# Patient Record
Sex: MALE | Race: WHITE | Employment: FULL TIME | URBAN - METROPOLITAN AREA
[De-identification: names, ages, dates, MRNs, and addresses within clinical notes are randomized per-mention and may not be internally consistent; named-entity substitution may affect disease eponyms.]

---

## 2022-08-08 ENCOUNTER — OFFICE VISIT (OUTPATIENT)
Dept: ENT CLINIC | Age: 37
End: 2022-08-08
Payer: COMMERCIAL

## 2022-08-08 VITALS
WEIGHT: 169 LBS | SYSTOLIC BLOOD PRESSURE: 113 MMHG | HEIGHT: 68 IN | DIASTOLIC BLOOD PRESSURE: 69 MMHG | BODY MASS INDEX: 25.61 KG/M2 | HEART RATE: 63 BPM

## 2022-08-08 DIAGNOSIS — J34.89 NASAL OBSTRUCTION: ICD-10-CM

## 2022-08-08 DIAGNOSIS — J32.4 CHRONIC PANSINUSITIS: ICD-10-CM

## 2022-08-08 DIAGNOSIS — J33.8 NASAL SINUS POLYP: Primary | ICD-10-CM

## 2022-08-08 DIAGNOSIS — J34.3 NASAL TURBINATE HYPERTROPHY: ICD-10-CM

## 2022-08-08 PROCEDURE — 99204 OFFICE O/P NEW MOD 45 MIN: CPT | Performed by: OTOLARYNGOLOGY

## 2022-08-08 PROCEDURE — 31231 NASAL ENDOSCOPY DX: CPT | Performed by: OTOLARYNGOLOGY

## 2022-08-08 ASSESSMENT — ENCOUNTER SYMPTOMS
STRIDOR: 0
BLOOD IN STOOL: 0
BACK PAIN: 0
CONSTIPATION: 0
APNEA: 0
SORE THROAT: 0
SINUS PAIN: 0
COUGH: 0
TROUBLE SWALLOWING: 0
SHORTNESS OF BREATH: 0
CHOKING: 0
NAUSEA: 0
VOICE CHANGE: 0
RHINORRHEA: 0
COLOR CHANGE: 0
VOMITING: 0
EYE DISCHARGE: 0
FACIAL SWELLING: 0
WHEEZING: 0
DIARRHEA: 0
SINUS PRESSURE: 0
EYE PAIN: 0
CHEST TIGHTNESS: 0

## 2022-08-08 NOTE — LETTER
[facility]  [address]  Phone: [phone]  Fax: [phone]    Ofelia John MD    August 8, 2022     [place] [address]    Patient: John Paul Thoams   MR Number: [id number]   YOB: 1985   Date of Visit: 8/8/2022       Dear Alannah Rich: Thank you for referring Becka Hernandez to me for evaluation/treatment. Below are the relevant portions of my assessment and plan of care. Diagnosis Orders   1. Nasal sinus polyp  CT SINUS WO CONTRAST      2. Nasal obstruction  CT SINUS WO CONTRAST      3. Chronic pansinusitis  CT SINUS WO CONTRAST              OR for revision surgery. [place] image guidance CT scan sinuses. The patient has a diagnosis of CRS with polyps and nasal obstruction, turbinate hypertrophy which has not been responsive or cannot be treated with maximal medical therapy. The patient has been advised of options including further medical therapy, surgery, or nothing. The risks and benefits of surgery were described not limited to injury to the skull base, brain, stroke, hemorrhage, brain fluid leak, double vision, visual loss, epistaxis, and need for further surgical management of disease. Patient expectations during and after surgery including post-operative sinonasal care and pain control were described. Other health co-morbidities that would increase the risks of bodily harm, complications and risk of death including none were not discussed . Questions were answered and the patient agreed to proceed with surgery which will be scheduled in an appropriate time frame in line with the severity of disease. If you have questions, please do not hesitate to call me. I look forward to following Almas Gillespie along with you.     Sincerely,      Ofelia John MD

## 2022-08-08 NOTE — LETTER
[facility]    Surgery Schedule Request Form: 08/08/22  [address] Nany Guo      DATE OF SURGERY: 8-    TIME OF SURGERY:  7:30AM            CONF #: ____________________       Patient Information:    Patient name: Des Ca    YOB: 1985 Age/Sex:36 y.o./male    SS #:[id number]    Wt Readings from Last 1 Encounters:   08/08/22 169 lb (76.7 kg)       Telephone Information:   Mobile [phone]     Home [phone]     Surgeon & Procedure Information:     Lead surgeon: Jovon Garcia Co-Surgeon: ga  Phone: [phone] Fax: [phone]  PCP: Annie Rojas DO    Diagnosis: Chronic pansinusitis-J32.4, turbinate hypertrophy-J34.3, nasal sinus polyps-J33.8, nasal obstruction-J34.89    Procedure name/CPT: Bilateral Inferior Turbinate Reduction (01107-30), Bilateral Maxillary Antrostomy with Tissue Removal (50193-17), Bilateral Sphenoidostomy (60868-12), Frontal Sinus Exploration (79953) and Image Navigation (52030) Please not frontal sinus should be 50 modified. Post-operative debridement (55181-40, 2 units    Procedure length: 2 hours Anesthesia: General    Special Equipment: yes - [place] Image GUidance    Patient Status: SDS (OP)    Primary Payor Plan: [employer]  Member ID: [id number]   [address] [place] [address] Drive name: Dixon Harsh     [] Implement attached clinical orders for patient.       Electronically signed by Bassam Wade MD on 8/8/2022 at 11:48 AM

## 2022-08-08 NOTE — PROGRESS NOTES
Subjective:      Patient ID: Robbie Lyons is a 43 y.o. male. HPI  Chief Complaint   Patient presents in consultation from Dr. Hien Varela problem  History of Present Illness:Jerod is a(n) 43 y.o. male who presents with a 10+ year history of CRS with polyps. Last surgery 2011. UC. Now with 6+ month history of worsening congestion and thick mucoid debris. Using flonase. Nasal Discharge: thick mucoid  Nasal Obstruction: Yes bilateral; constant  Post Nasal Drainage: No  Nasal Bleeding: No  Sense of Smell: decreased   time. Current allergist:  No.  History of Facial/Head Trauma: No  Pain/Discomfort:No  Headaches: none  Aspirin Sensitivity: No  Eye Symptoms: none  Previous Treatments: As above   Also Singulair  Afrin helps congestion.  Uses 2-3 times a month        Patient Active Problem List   Diagnosis    Asthma    Intractable chronic migraine without aura    Medication overuse headache    H/O viral meningitis    Cervicalgia    Bilateral carpal tunnel syndrome    Peroneal neuropathy    Depression    ADHD (attention deficit hyperactivity disorder)     Past Surgical History:   Procedure Laterality Date    SINUS SURGERY  2008    Polyps, Sinus Re-construction, Septum     Family History   Problem Relation Age of Onset    Other Mother         Headache    Other Father         Headache    Hypertension Father     Depression Father        Social History     Socioeconomic History    Marital status:      Spouse name: Not on file    Number of children: Not on file    Years of education: Not on file    Highest education level: Not on file   Occupational History    Not on file   Tobacco Use    Smoking status: Never    Smokeless tobacco: Never   Substance and Sexual Activity    Alcohol use: Yes     Comment: 2-3 times per week     Drug use: No    Sexual activity: Not on file   Other Topics Concern    Not on file   Social History Narrative    Not on file     Social Determinants of Health     Financial Resource Strain: Not on file   Food Insecurity: Not on file   Transportation Needs: Not on file   Physical Activity: Not on file   Stress: Not on file   Social Connections: Not on file   Intimate Partner Violence: Not on file   Housing Stability: Not on file       DRUG/FOOD ALLERGIES: Patient has no known allergies. CURRENT MEDICATIONS  Prior to Admission medications    Medication Sig Start Date End Date Taking? Authorizing Provider   VENTOLIN  (90 BASE) MCG/ACT inhaler INHALE 2 PUFFS BY MOUTH FOUR TIMES DAILY AS NEEDED 7/22/16  Yes Hubert Hicks DO   montelukast (SINGULAIR) 10 MG tablet Take 10 mg by mouth nightly. Yes Historical Provider, MD   Multiple Vitamins-Minerals (MULTIVITAMIN PO) Take  by mouth. Yes Historical Provider, MD   ibuprofen (ADVIL;MOTRIN) 200 MG tablet Take 200 mg by mouth every 6 hours as needed. Yes Historical Provider, MD   Pseudoephedrine-Naproxen Na -220 MG TB12 Take by mouth    Historical Provider, MD   amphetamine-dextroamphetamine (ADDERALL XR) 20 MG XR capsule Take 20 mg by mouth 2 times daily    Historical Provider, MD   budesonide-formoterol (SYMBICORT) 80-4.5 MCG/ACT AERO Inhale 2 puffs into the lungs 2 times daily    Historical Provider, MD   venlafaxine (EFFEXOR) 37.5 MG tablet Take 37.5 mg by mouth daily    Historical Provider, MD   SUMAtriptan Succinate 6 MG/0.5ML RAY Inject 1 applicator into the skin as needed for Other (Migraine). 12/9/13   Alison Ulrich MD   topiramate (TOPAMAX) 50 MG tablet Take 1 tablet by mouth 2 times daily. 12/9/13   Alison Ulrich MD   SUMAtriptan (IMITREX) 100 MG tablet Take 1 tablet by mouth once as needed for Migraine for 1 dose.  12/9/13   Padmaja Wu Contractor, MD       Lab Studies:No results found for: WBC, HGB, HCT, MCV, PLT  No results found for: GLUCOSE, BUN, CREATININE, K, NA, CL, CALCIUM  No results found for: MG  No results found for: PHOS  No results found for: ALKPHOS, ALT, AST, BILITOT, ALBUMIN, PROT, LABGLOB Review of Systems   Constitutional:  Negative for activity change, appetite change, chills, fatigue and fever. HENT:  Positive for congestion. Negative for dental problem, drooling, ear discharge, ear pain, facial swelling, hearing loss, mouth sores, nosebleeds, postnasal drip, rhinorrhea, sinus pressure, sinus pain, sneezing, sore throat, tinnitus, trouble swallowing and voice change. Eyes:  Negative for pain, discharge and visual disturbance. Respiratory:  Negative for apnea, cough, choking, chest tightness, shortness of breath, wheezing and stridor. Cardiovascular:  Negative for palpitations. Gastrointestinal:  Negative for blood in stool, constipation, diarrhea, nausea and vomiting. Endocrine: Negative for cold intolerance, heat intolerance, polydipsia, polyphagia and polyuria. Musculoskeletal:  Negative for back pain, gait problem, neck pain and neck stiffness. Skin:  Negative for color change, pallor, rash and wound. Allergic/Immunologic: Negative for environmental allergies, food allergies and immunocompromised state. Neurological:  Negative for dizziness, facial asymmetry, speech difficulty, light-headedness, numbness and headaches. Hematological:  Negative for adenopathy. Does not bruise/bleed easily. Psychiatric/Behavioral:  Negative for agitation, confusion, self-injury and sleep disturbance. The patient is not nervous/anxious. Objective:   Physical Exam  Constitutional:       Appearance: He is well-developed. He is not ill-appearing. HENT:      Head: Normocephalic and atraumatic. Not macrocephalic and not microcephalic. No raccoon eyes, Munoz's sign, abrasion, contusion, right periorbital erythema, left periorbital erythema or laceration. Hair is normal.      Jaw: No trismus. Salivary Glands: Right salivary gland is not diffusely enlarged. Left salivary gland is not diffusely enlarged.       Right Ear: Hearing, tympanic membrane and external ear normal. No decreased hearing noted. No drainage, swelling or tenderness. No middle ear effusion. No mastoid tenderness. Tympanic membrane is not perforated, retracted or bulging. Tympanic membrane has normal mobility. Left Ear: Hearing, tympanic membrane and external ear normal. No decreased hearing noted. No drainage, swelling or tenderness. No middle ear effusion. No mastoid tenderness. Tympanic membrane is not perforated, retracted or bulging. Tympanic membrane has normal mobility. Ears:      [place] exam findings: Does not lateralize. Right Rinne: AC > BC. Left Rinne: AC > BC. Nose: No nasal deformity, septal deviation, laceration, mucosal edema or rhinorrhea. Right Nostril: No epistaxis. Left Nostril: No epistaxis. Right Turbinates: Enlarged and swollen. Left Turbinates: Enlarged and swollen. Right Sinus: No maxillary sinus tenderness or frontal sinus tenderness. Left Sinus: No maxillary sinus tenderness or frontal sinus tenderness. Mouth/Throat:      Lips: No lesions. Mouth: Mucous membranes are not pale, not dry and not cyanotic. No lacerations or oral lesions. Dentition: Normal dentition. No dental caries or dental abscesses. Tongue: No lesions. Palate: No mass. Pharynx: Uvula midline. No oropharyngeal exudate, posterior oropharyngeal erythema or uvula swelling. Tonsils: No tonsillar abscesses. Eyes:      General: Lids are normal. Lids are everted, no foreign bodies appreciated. Right eye: No discharge. Left eye: No discharge. Extraocular Movements:      Right eye: Normal extraocular motion and no nystagmus. Left eye: Normal extraocular motion and no nystagmus. Conjunctiva/sclera:      Right eye: No chemosis or exudate. Left eye: No chemosis or exudate. Neck:      Thyroid: No thyroid mass or thyromegaly. Vascular: Normal carotid pulses. Trachea: Trachea normal. No tracheal deviation. Cardiovascular:      Rate and Rhythm: Normal rate and regular rhythm. Pulmonary:      Effort: No tachypnea, bradypnea or respiratory distress. Breath sounds: No stridor. Musculoskeletal:      Right shoulder: Normal range of motion. Left shoulder: Normal range of motion. Cervical back: Neck supple. Lymphadenopathy:      Head:      Right side of head: No submental, submandibular, tonsillar, preauricular, posterior auricular or occipital adenopathy. Left side of head: No submental, submandibular, tonsillar, preauricular, posterior auricular or occipital adenopathy. Cervical:      Right cervical: No superficial, deep or posterior cervical adenopathy. Left cervical: No superficial, deep or posterior cervical adenopathy. Skin:     Findings: No bruising, erythema, laceration or lesion. Neurological:      Mental Status: He is alert and oriented to person, place, and time. Psychiatric:         Speech: Speech normal.         Behavior: Behavior normal.         Due to the patients chronic sinus disease and/or history of sinonasal neoplasm for surveillance a nasal endoscopy with or without debridement will be performed to complete a significant physical examination of the patient which cannot be performed by anterior rhinoscopy alone (failure of complete examination of the paranasal sinuses). Failure to provide this procedure may lead to late detection of significant chronic benign disease, acute exacerbation, resolution or failure of early diagnosis of recurrent cancer. The procedure report is present in the body of the chart. Nasal Endoscopy    Pre OP: CRS with polyps  Post OP: Recurrent polyps  Reason: 5 years since last visit  Procedure: Nasal endoscopy  Surgeon: Reyes Banco  Anesthesia: Afrin with 2% lidocaine  Estimated Blood Loss: None      After obtaining verbal consent from the patient 1% lidocaine with afrin was sprayed into the nasal cavities.   After allowing a time for anesthesia, a nasal endoscope was placed into the nostril. The septum, inferior, and middle turbinates were examined. The middle meatus, and sphenoethmoid recess was examined bilaterally. Cultures were not obtained from the sinuses. There were no complications. Pertinent positives included: There was not edema and purulence in the left middle meatus. There was not edema and purulence at the right middle meatus. Polyps were identified in the maxillary and ethmoid  sinuses with partial airway obstruction Masses were not identified. Tolerated well without complication. I attest that I was present for and did the entire procedure myself. Assessment:       Diagnosis Orders   1. Nasal sinus polyp  CT SINUS WO CONTRAST      2. Nasal obstruction  CT SINUS WO CONTRAST      3. Chronic pansinusitis  CT SINUS WO CONTRAST                Plan:      OR for revision surgery. [place] image guidance CT scan sinuses. The patient has a diagnosis of CRS with polyps and nasal obstruction, turbinate hypertrophy which has not been responsive or cannot be treated with maximal medical therapy. The patient has been advised of options including further medical therapy, surgery, or nothing. The risks and benefits of surgery were described not limited to injury to the skull base, brain, stroke, hemorrhage, brain fluid leak, double vision, visual loss, epistaxis, and need for further surgical management of disease. Patient expectations during and after surgery including post-operative sinonasal care and pain control were described. Other health co-morbidities that would increase the risks of bodily harm, complications and risk of death including none were not discussed . Questions were answered and the patient agreed to proceed with surgery which will be scheduled in an appropriate time frame in line with the severity of disease.           Evelina Weiss MD

## 2022-08-11 NOTE — PROGRESS NOTES
Place patient label inside box (if no patient label, complete below)  Name:  :  MR#:   [address] / PROCEDURE  I (we), Vasquez Angela (Patient Name) authorize Ashley Krishnamurthy MD (Provider / Simi Blake) and/or such assistants as may be selected by him/her, to perform the following operation/procedure(s): BILATERAL INFERIOR TURBINATE REDUCTION, BILATERAL MAXILLARY ANTROSTOMY WITH TISSUE REMOVAL, BILATERAL SPHENOIDOSTOMY, FRONTAL SINUS EXPLORATION AND IMAGE NAVIGATION        Note: If unable to obtain consent prior to an emergent procedure, document the emergent reason in the medical record. This procedure has been explained to my (our) satisfaction and included in the explanation was: The intended benefit, nature, and extent of the procedure to be performed; The significant risks involved and the probability of success; Alternative procedures and methods of treatment; The dangers and probable consequences of such alternatives (including no procedure or treatment); The expected consequences of the procedure on my future health; Whether other qualified individuals would be performing important surgical tasks and/or whether  would be present to advise or support the procedure. I (we) understand that there are other risks of infection and other serious complications in the pre-operative/procedural and postoperative/procedural stages of my (our) care. I (we) have asked all of the questions which I (we) thought were important in deciding whether or not to undergo treatment or diagnosis. These questions have been answered to my (our) satisfaction. I (we) understand that no assurance can be given that the procedure will be a success, and no guarantee or warranty of success has been given to me (us).     It has been explained to me (us) that during the course of the operation/procedure, unforeseen conditions may be revealed that necessitate extension of the original procedure(s) or different procedure(s) than those set forth in Paragraph 1. I (we) authorize and request that the above-named physician, his/her assistants or his/her designees, perform procedures as necessary and desirable if deemed to be in my (our) best interest.     Revised 8/2/2021                                                                          Page 1 of 2       I acknowledge that health care personnel may be observing this procedure for the purpose of medical education or other specified purposes as may be necessary as requested and/or approved by my (our) physician. I (we) consent to the disposal by the hospital Pathologist of the removed tissue, parts or organs in accordance with hospital policy. I do ____ do not ____ consent to the use of a local infiltration pain blocking agent that will be used by my provider/surgical provider to help alleviate pain during my procedure. I do ____ do not ____ consent to an emergent blood transfusion in the case of a life-threatening situation that requires blood components to be administered. This consent is valid for 24 hours from the beginning of the procedure. This patient does ____ or does not ____ currently have a DNR status/order. If DNR order is in place, obtain Addendum to the Surgical Consent for ALL Patients with a DNR Order to address christiano-operative status for limited intervention or DNR suspension.      I have read and fully understand the above Consent for Operation/Procedure and that all blanks were completed before I signed the consent.   _____________________________       _____________________      ____/____am/pm  Signature of Patient or legal representative      Printed Name / Relationship            Date / Time   ____________________________       _____________________      ____/____am/pm  Witness to Signature                                    Printed Name                    Date / Time    If patient is unable to sign or is a minor, complete the following)  Patient is a minor, ____ years of age, or unable to sign because:   ______________________________________________________________________________________________    If a phone consent is obtained, consent will be documented by using two health care professionals, each affirming that the consenting party has no questions and gives consent for the procedure discussed with the physician/provider.   _____________________          ____________________       _____/_____am/pm   2nd witness to phone consent        Printed name           Date / Time    Informed Consent:  I have provided the explanation described above in section 1 to the patient and/or legal representative.  I have provided the patient and/or legal representative with an opportunity to ask any questions about the proposed operation/procedure.   ___________________________          ____________________         ____/____am/pm  Provider / Proceduralist                            Printed name            Date / Time  Revised 8/2/2021                                                                      Page 2 of 2

## 2022-08-11 NOTE — PROGRESS NOTES
[facility] PRE-SURGICAL TESTING INSTRUCTIONS                      PRIOR TO PROCEDURE DATE:    1. PLEASE FOLLOW ANY INSTRUCTIONS GIVEN TO YOU PER YOUR SURGEON. 2. Arrange for someone to drive you home and be with you for the first 24 hours after discharge for your safety after your procedure for which you received sedation. Ensure it is someone we can share information with regarding your discharge. NOTE: At this time ONLY 2 ADULTS may accompany you & everyone must be MASKED. 3. You must contact your surgeon for instructions IF:  You are taking any blood thinners, aspirin, anti-inflammatory or vitamins. There is a change in your physical condition such as a cold, fever, rash, cuts, sores, or any other infection, especially near your surgical site. 4. Do not drink alcohol the day before or day of your procedure. Do not use any recreational marijuana at least 24 hours or street drugs (heroin, cocaine) at minimum 5 days prior to your procedure. 5. A Pre-Surgical History and Physical MUST be completed WITHIN 30 DAYS OR LESS prior to your procedure. by your Physician or an Urgent Care        THE DAY OF YOUR PROCEDURE:  1. Follow instructions for ARRIVAL TIME as DIRECTED BY YOUR SURGEON. 2. Enter the MAIN entrance from [address] and follow the signs to the free Parking jellyfish or [employer] (offered free of charge 7 am-5pm). 3. Enter the Main Entrance of the hospital (do not enter from the lower level of the parking garage). Upon entrance, check in with the  at the surgical information desk on your LEFT. Bring your insurance card and photo ID to register      4. DO NOT EAT ANYTHING 8 hours prior to arrival for surgery. You may have up to 8 ounces of water 4 hours prior to your arrival for surgery.    NOTE: ALL Gastric, Bariatric & Bowel surgery patients - you MUST follow your surgeon's instructions regarding eating/ drinking as you will have very specific instructions to follow. If you did not receive these, call your surgeon's office immediately. 5. MEDICATIONS:  Take the following medications with a SMALL sip of water: NONE BRING INHALER  Use your usual dose of inhalers the morning of surgery. BRING your rescue inhaler with you to hospital.   Anesthesia does NOT want you to take insulin the morning of surgery. They will control your blood sugar while you are at the hospital. Please contact your ordering physician for instructions regarding your insulin the night before your procedure. If you have an insulin pump, please keep it set on basal rate. Bariatric patient's call your surgeon if on diabetic medications as some may need to be stopped 1 week prior to surgery    6. Do not swallow additional water when brushing teeth. No gum, candy, mints, or ice chips. Refrain from smoking or at least decrease the amount on day of surgery. 7. Morning of surgery:   Take a shower with an antibacterial soap (i.e., Safeguard or Dial) OR your physician may have instructed you to use Hibiclens. Dress in loose, comfortable clothing appropriate for redressing after your procedure. Do not wear jewelry (including body piercings), make-up (especially NO eye make-up), fingernail polish (NO toenail polish if foot/leg surgery), lotion, powders, or metal hairclips. Do not shave or wax for 72 hours prior to procedure near your operative site. Shaving with a razor can irritate your skin and make it easier to develop an infection. On the day of your procedure, any hair that needs to be removed near the surgical site will be 'clipped' by a healthcare worker using a special clipper designed to avoid skin irritation. 8. Dentures, glasses, or contacts will need to be removed before your procedure. Bring cases for your glasses, contacts, dentures, or hearing aids to protect them while you are in surgery.       9. If you use a CPAP, please bring it with you on the day of your procedure. 10. We recommend that valuable personal belongings such as cash, cell phones, e-tablets, or jewelry, be left at home during your stay. The hospital will not be responsible for valuables that are not secured in the hospital safe. However, if your insurance requires a co-pay, you may want to bring a method of payment, i.e., Check or credit card, if you wish to pay your co-pay the day of surgery. 11. If you are to stay overnight, you may bring a bag with personal items. Please have any large items you may need brought in by your family after your arrival to your hospital room. 12. If you have a Living Will or Durable Power of , please bring a copy on the day of your procedure. How we keep you safe and work to prevent surgical site infections:   1. Health care workers should always check your ID bracelet to verify your name and birth date. You will be asked many times to state your name, date of birth, and allergies. 2. Health care workers should always clean their hands with soap or alcohol gel before providing care to you. It is okay to ask anyone if they cleaned their hands before they touch you. 3. You will be actively involved in verifying the type of procedure you are having and ensuring the correct surgical site. This will be confirmed multiple times prior to your procedure. Do NOT yoanna your surgery site UNLESS instructed to by your surgeon. 4. When you are in the operating room, your surgical site will be cleansed with a special soap, and in most cases, you will be given an antibiotic before the surgery begins. What to expect AFTER your procedure? 1. Immediately following your procedure, your will be taken to the PACU for the first phase of your recovery. Your nurse will help you recover from any potential side effects of anesthesia, such as extreme drowsiness, changes in your vital signs or breathing patterns.  Nausea, headache, muscle aches, or sore throat may also occur after anesthesia. Your nurse will help you manage these potential side effects. 2. For comfort and safety, arrange to have someone at home with you for the first 24 hours after discharge. 3. You and your family will be given written instructions about your diet, activity, dressing care, medications, and return visits. 4. Once at home, should issues with nausea, pain, or bleeding occur, or should you notice any signs of infection, you should call your surgeon. 5. Always clean your hands before and after caring for your wound. Do not let your family touch your surgery site without cleaning their hands. 6. Narcotic pain medications can cause significant constipation. You may want to add a stool softener to your postoperative medication schedule or speak to your surgeon on how best to manage this SIDE EFFECT. SPECIAL INSTRUCTIONS BRING INHALER PATIENT ACKNOWLEDGES UNDERSTANDING OF THIS AND PRE OP INSTRUCTIONS. Thank you for allowing us to care for you. We strive to exceed your expectations in the delivery of care and service provided to you and your family. If you need to contact the [place] [address] staff for any reason, please call us at [phone]    Instructions reviewed with patient during preadmission testing phone interview.   Danae Ramirez RN.8/11/2022 .8:43 AM      ADDITIONAL EDUCATIONAL INFORMATION REVIEWED PER PHONE WITH YOU AND/OR YOUR FAMILY:  No Hibiclens® Bathing Instructions   Yes Antibacterial Soap

## 2022-08-12 ENCOUNTER — HOSPITAL ENCOUNTER (OUTPATIENT)
Dept: CT IMAGING | Age: 37
Discharge: HOME | End: 2022-08-12
Payer: COMMERCIAL

## 2022-08-12 DIAGNOSIS — J34.89 NASAL OBSTRUCTION: ICD-10-CM

## 2022-08-12 DIAGNOSIS — J32.4 CHRONIC PANSINUSITIS: ICD-10-CM

## 2022-08-12 DIAGNOSIS — J33.8 NASAL SINUS POLYP: ICD-10-CM

## 2022-08-12 PROCEDURE — 70486 CT MAXILLOFACIAL W/O DYE: CPT

## 2022-08-19 ENCOUNTER — ANESTHESIA (OUTPATIENT)
Dept: OPERATING ROOM | Age: 37
End: 2022-08-19
Payer: COMMERCIAL

## 2022-08-19 ENCOUNTER — ANESTHESIA EVENT (OUTPATIENT)
Dept: OPERATING ROOM | Age: 37
End: 2022-08-19
Payer: COMMERCIAL

## 2022-08-19 ENCOUNTER — HOSPITAL ENCOUNTER (OUTPATIENT)
Age: 37
Setting detail: SURGERY ADMIT
Discharge: HOME | End: 2022-08-19
Attending: OTOLARYNGOLOGY | Admitting: OTOLARYNGOLOGY
Payer: COMMERCIAL

## 2022-08-19 VITALS
RESPIRATION RATE: 14 BRPM | HEIGHT: 68 IN | WEIGHT: 175 LBS | SYSTOLIC BLOOD PRESSURE: 166 MMHG | HEART RATE: 78 BPM | BODY MASS INDEX: 26.52 KG/M2 | DIASTOLIC BLOOD PRESSURE: 99 MMHG | TEMPERATURE: 98.1 F | OXYGEN SATURATION: 95 %

## 2022-08-19 DIAGNOSIS — J34.89 NASAL OBSTRUCTION: ICD-10-CM

## 2022-08-19 DIAGNOSIS — J33.8 NASAL SINUS POLYP: ICD-10-CM

## 2022-08-19 DIAGNOSIS — J34.3 NASAL TURBINATE HYPERTROPHY: ICD-10-CM

## 2022-08-19 DIAGNOSIS — J32.4 CHRONIC PANSINUSITIS: ICD-10-CM

## 2022-08-19 PROCEDURE — 61782 SCAN PROC CRANIAL EXTRA: CPT | Performed by: OTOLARYNGOLOGY

## 2022-08-19 PROCEDURE — 31267 ENDOSCOPY MAXILLARY SINUS: CPT | Performed by: OTOLARYNGOLOGY

## 2022-08-19 PROCEDURE — 6370000000 HC RX 637 (ALT 250 FOR IP): Performed by: OTOLARYNGOLOGY

## 2022-08-19 PROCEDURE — 3600000014 HC SURGERY LEVEL 4 ADDTL 15MIN: Performed by: OTOLARYNGOLOGY

## 2022-08-19 PROCEDURE — 2580000003 HC RX 258: Performed by: ANESTHESIOLOGY

## 2022-08-19 PROCEDURE — 2500000003 HC RX 250 WO HCPCS: Performed by: NURSE ANESTHETIST, CERTIFIED REGISTERED

## 2022-08-19 PROCEDURE — 7100000011 HC PHASE II RECOVERY - ADDTL 15 MIN: Performed by: OTOLARYNGOLOGY

## 2022-08-19 PROCEDURE — 6360000002 HC RX W HCPCS: Performed by: ANESTHESIOLOGY

## 2022-08-19 PROCEDURE — 7100000010 HC PHASE II RECOVERY - FIRST 15 MIN: Performed by: OTOLARYNGOLOGY

## 2022-08-19 PROCEDURE — 7100000001 HC PACU RECOVERY - ADDTL 15 MIN: Performed by: OTOLARYNGOLOGY

## 2022-08-19 PROCEDURE — 3600000004 HC SURGERY LEVEL 4 BASE: Performed by: OTOLARYNGOLOGY

## 2022-08-19 PROCEDURE — 31259 NSL/SINS NDSC SPHN TISS RMVL: CPT | Performed by: OTOLARYNGOLOGY

## 2022-08-19 PROCEDURE — 2580000003 HC RX 258: Performed by: NURSE ANESTHETIST, CERTIFIED REGISTERED

## 2022-08-19 PROCEDURE — 2720000010 HC SURG SUPPLY STERILE: Performed by: OTOLARYNGOLOGY

## 2022-08-19 PROCEDURE — 6360000002 HC RX W HCPCS: Performed by: NURSE ANESTHETIST, CERTIFIED REGISTERED

## 2022-08-19 PROCEDURE — 2709999900 HC NON-CHARGEABLE SUPPLY: Performed by: OTOLARYNGOLOGY

## 2022-08-19 PROCEDURE — 6370000000 HC RX 637 (ALT 250 FOR IP): Performed by: ANESTHESIOLOGY

## 2022-08-19 PROCEDURE — 88304 TISSUE EXAM BY PATHOLOGIST: CPT

## 2022-08-19 PROCEDURE — 3700000001 HC ADD 15 MINUTES (ANESTHESIA): Performed by: OTOLARYNGOLOGY

## 2022-08-19 PROCEDURE — 2500000003 HC RX 250 WO HCPCS: Performed by: OTOLARYNGOLOGY

## 2022-08-19 PROCEDURE — 7100000000 HC PACU RECOVERY - FIRST 15 MIN: Performed by: OTOLARYNGOLOGY

## 2022-08-19 PROCEDURE — 31276 NSL/SINS NDSC FRNT TISS RMVL: CPT | Performed by: OTOLARYNGOLOGY

## 2022-08-19 PROCEDURE — 30140 RESECT INFERIOR TURBINATE: CPT | Performed by: OTOLARYNGOLOGY

## 2022-08-19 PROCEDURE — 3700000000 HC ANESTHESIA ATTENDED CARE: Performed by: OTOLARYNGOLOGY

## 2022-08-19 PROCEDURE — 2500000003 HC RX 250 WO HCPCS: Performed by: ANESTHESIOLOGY

## 2022-08-19 RX ORDER — OXYCODONE HYDROCHLORIDE 5 MG/1
10 TABLET ORAL PRN
Status: COMPLETED | OUTPATIENT
Start: 2022-08-19 | End: 2022-08-19

## 2022-08-19 RX ORDER — ONDANSETRON 2 MG/ML
4 INJECTION INTRAMUSCULAR; INTRAVENOUS
Status: COMPLETED | OUTPATIENT
Start: 2022-08-19 | End: 2022-08-19

## 2022-08-19 RX ORDER — SODIUM CHLORIDE, SODIUM LACTATE, POTASSIUM CHLORIDE, CALCIUM CHLORIDE 600; 310; 30; 20 MG/100ML; MG/100ML; MG/100ML; MG/100ML
INJECTION, SOLUTION INTRAVENOUS CONTINUOUS
Status: DISCONTINUED | OUTPATIENT
Start: 2022-08-19 | End: 2022-08-19 | Stop reason: HOSPADM

## 2022-08-19 RX ORDER — OXYCODONE HYDROCHLORIDE 5 MG/1
5 TABLET ORAL EVERY 6 HOURS PRN
Qty: 10 TABLET | Refills: 0 | Status: SHIPPED | OUTPATIENT
Start: 2022-08-19 | End: 2022-08-29

## 2022-08-19 RX ORDER — OXYMETAZOLINE HYDROCHLORIDE 0.05 G/100ML
2 SPRAY NASAL 2 TIMES DAILY
COMMUNITY

## 2022-08-19 RX ORDER — MIDAZOLAM HYDROCHLORIDE 1 MG/ML
2 INJECTION INTRAMUSCULAR; INTRAVENOUS
Status: DISCONTINUED | OUTPATIENT
Start: 2022-08-19 | End: 2022-08-19 | Stop reason: HOSPADM

## 2022-08-19 RX ORDER — ACETAMINOPHEN, ASPIRIN AND CAFFEINE 250; 250; 65 MG/1; MG/1; MG/1
TABLET, FILM COATED ORAL
COMMUNITY

## 2022-08-19 RX ORDER — SODIUM CHLORIDE 9 MG/ML
25 INJECTION, SOLUTION INTRAVENOUS PRN
Status: DISCONTINUED | OUTPATIENT
Start: 2022-08-19 | End: 2022-08-19 | Stop reason: HOSPADM

## 2022-08-19 RX ORDER — SODIUM CHLORIDE 0.9 % (FLUSH) 0.9 %
5-40 SYRINGE (ML) INJECTION PRN
Status: DISCONTINUED | OUTPATIENT
Start: 2022-08-19 | End: 2022-08-19 | Stop reason: HOSPADM

## 2022-08-19 RX ORDER — ONDANSETRON 2 MG/ML
INJECTION INTRAMUSCULAR; INTRAVENOUS PRN
Status: DISCONTINUED | OUTPATIENT
Start: 2022-08-19 | End: 2022-08-19 | Stop reason: SDUPTHER

## 2022-08-19 RX ORDER — LABETALOL HYDROCHLORIDE 5 MG/ML
10 INJECTION, SOLUTION INTRAVENOUS
Status: DISCONTINUED | OUTPATIENT
Start: 2022-08-19 | End: 2022-08-19 | Stop reason: HOSPADM

## 2022-08-19 RX ORDER — OXYCODONE HYDROCHLORIDE 5 MG/1
5 TABLET ORAL PRN
Status: COMPLETED | OUTPATIENT
Start: 2022-08-19 | End: 2022-08-19

## 2022-08-19 RX ORDER — FENTANYL CITRATE 50 UG/ML
INJECTION, SOLUTION INTRAMUSCULAR; INTRAVENOUS PRN
Status: DISCONTINUED | OUTPATIENT
Start: 2022-08-19 | End: 2022-08-19 | Stop reason: SDUPTHER

## 2022-08-19 RX ORDER — ECHINACEA PURPUREA EXTRACT 125 MG
1 TABLET ORAL PRN
COMMUNITY

## 2022-08-19 RX ORDER — ROCURONIUM BROMIDE 10 MG/ML
INJECTION, SOLUTION INTRAVENOUS PRN
Status: DISCONTINUED | OUTPATIENT
Start: 2022-08-19 | End: 2022-08-19 | Stop reason: SDUPTHER

## 2022-08-19 RX ORDER — SODIUM CHLORIDE 0.9 % (FLUSH) 0.9 %
5-40 SYRINGE (ML) INJECTION EVERY 12 HOURS SCHEDULED
Status: DISCONTINUED | OUTPATIENT
Start: 2022-08-19 | End: 2022-08-19 | Stop reason: HOSPADM

## 2022-08-19 RX ORDER — SODIUM CHLORIDE, SODIUM LACTATE, POTASSIUM CHLORIDE, CALCIUM CHLORIDE 600; 310; 30; 20 MG/100ML; MG/100ML; MG/100ML; MG/100ML
INJECTION, SOLUTION INTRAVENOUS CONTINUOUS PRN
Status: DISCONTINUED | OUTPATIENT
Start: 2022-08-19 | End: 2022-08-19 | Stop reason: SDUPTHER

## 2022-08-19 RX ORDER — PROPOFOL 10 MG/ML
INJECTION, EMULSION INTRAVENOUS PRN
Status: DISCONTINUED | OUTPATIENT
Start: 2022-08-19 | End: 2022-08-19 | Stop reason: SDUPTHER

## 2022-08-19 RX ORDER — SUCCINYLCHOLINE/SOD CL,ISO/PF 200MG/10ML
SYRINGE (ML) INTRAVENOUS PRN
Status: DISCONTINUED | OUTPATIENT
Start: 2022-08-19 | End: 2022-08-19 | Stop reason: SDUPTHER

## 2022-08-19 RX ORDER — LIDOCAINE HYDROCHLORIDE 10 MG/ML
1 INJECTION, SOLUTION EPIDURAL; INFILTRATION; INTRACAUDAL; PERINEURAL
Status: DISCONTINUED | OUTPATIENT
Start: 2022-08-19 | End: 2022-08-19 | Stop reason: HOSPADM

## 2022-08-19 RX ORDER — FENTANYL CITRATE 50 UG/ML
50 INJECTION, SOLUTION INTRAMUSCULAR; INTRAVENOUS EVERY 5 MIN PRN
Status: COMPLETED | OUTPATIENT
Start: 2022-08-19 | End: 2022-08-19

## 2022-08-19 RX ORDER — OXYMETAZOLINE HYDROCHLORIDE 0.05 G/100ML
SPRAY NASAL PRN
Status: DISCONTINUED | OUTPATIENT
Start: 2022-08-19 | End: 2022-08-19 | Stop reason: ALTCHOICE

## 2022-08-19 RX ORDER — IPRATROPIUM BROMIDE AND ALBUTEROL SULFATE 2.5; .5 MG/3ML; MG/3ML
1 SOLUTION RESPIRATORY (INHALATION)
Status: DISCONTINUED | OUTPATIENT
Start: 2022-08-19 | End: 2022-08-19 | Stop reason: HOSPADM

## 2022-08-19 RX ORDER — DEXAMETHASONE SODIUM PHOSPHATE 4 MG/ML
INJECTION, SOLUTION INTRA-ARTICULAR; INTRALESIONAL; INTRAMUSCULAR; INTRAVENOUS; SOFT TISSUE PRN
Status: DISCONTINUED | OUTPATIENT
Start: 2022-08-19 | End: 2022-08-19 | Stop reason: SDUPTHER

## 2022-08-19 RX ORDER — SODIUM CHLORIDE 9 MG/ML
INJECTION, SOLUTION INTRAVENOUS PRN
Status: DISCONTINUED | OUTPATIENT
Start: 2022-08-19 | End: 2022-08-19 | Stop reason: HOSPADM

## 2022-08-19 RX ORDER — DEXTROAMPHETAMINE SACCHARATE, AMPHETAMINE ASPARTATE, DEXTROAMPHETAMINE SULFATE AND AMPHETAMINE SULFATE 5; 5; 5; 5 MG/1; MG/1; MG/1; MG/1
TABLET ORAL
COMMUNITY
Start: 2022-08-15

## 2022-08-19 RX ORDER — KETAMINE HCL IN NACL, ISO-OSM 20 MG/2 ML
SYRINGE (ML) INJECTION PRN
Status: DISCONTINUED | OUTPATIENT
Start: 2022-08-19 | End: 2022-08-19 | Stop reason: SDUPTHER

## 2022-08-19 RX ORDER — EPINEPHRINE NASAL SOLUTION 1 MG/ML
SOLUTION NASAL PRN
Status: DISCONTINUED | OUTPATIENT
Start: 2022-08-19 | End: 2022-08-19 | Stop reason: ALTCHOICE

## 2022-08-19 RX ORDER — LIDOCAINE HYDROCHLORIDE AND EPINEPHRINE 10; 10 MG/ML; UG/ML
INJECTION, SOLUTION INFILTRATION; PERINEURAL PRN
Status: DISCONTINUED | OUTPATIENT
Start: 2022-08-19 | End: 2022-08-19 | Stop reason: ALTCHOICE

## 2022-08-19 RX ORDER — MIDAZOLAM HYDROCHLORIDE 1 MG/ML
INJECTION INTRAMUSCULAR; INTRAVENOUS PRN
Status: DISCONTINUED | OUTPATIENT
Start: 2022-08-19 | End: 2022-08-19 | Stop reason: SDUPTHER

## 2022-08-19 RX ORDER — METOCLOPRAMIDE HYDROCHLORIDE 5 MG/ML
10 INJECTION INTRAMUSCULAR; INTRAVENOUS
Status: DISCONTINUED | OUTPATIENT
Start: 2022-08-19 | End: 2022-08-19 | Stop reason: HOSPADM

## 2022-08-19 RX ADMIN — MIDAZOLAM HYDROCHLORIDE 2 MG: 2 INJECTION, SOLUTION INTRAMUSCULAR; INTRAVENOUS at 07:28

## 2022-08-19 RX ADMIN — ROCURONIUM BROMIDE 90 MG: 10 INJECTION, SOLUTION INTRAVENOUS at 07:42

## 2022-08-19 RX ADMIN — Medication 140 MG: at 07:33

## 2022-08-19 RX ADMIN — PROPOFOL 200 MG: 10 INJECTION, EMULSION INTRAVENOUS at 07:31

## 2022-08-19 RX ADMIN — OXYCODONE 5 MG: 5 TABLET ORAL at 09:34

## 2022-08-19 RX ADMIN — SUGAMMADEX 200 MG: 100 INJECTION, SOLUTION INTRAVENOUS at 08:33

## 2022-08-19 RX ADMIN — Medication 20 MG: at 07:28

## 2022-08-19 RX ADMIN — DEXAMETHASONE SODIUM PHOSPHATE 8 MG: 4 INJECTION, SOLUTION INTRAMUSCULAR; INTRAVENOUS at 07:38

## 2022-08-19 RX ADMIN — SODIUM CHLORIDE, SODIUM LACTATE, POTASSIUM CHLORIDE, AND CALCIUM CHLORIDE: .6; .31; .03; .02 INJECTION, SOLUTION INTRAVENOUS at 07:28

## 2022-08-19 RX ADMIN — FENTANYL CITRATE 100 MCG: 50 INJECTION, SOLUTION INTRAMUSCULAR; INTRAVENOUS at 07:34

## 2022-08-19 RX ADMIN — ONDANSETRON 4 MG: 2 INJECTION INTRAMUSCULAR; INTRAVENOUS at 07:38

## 2022-08-19 RX ADMIN — FENTANYL CITRATE 50 MCG: 50 INJECTION, SOLUTION INTRAMUSCULAR; INTRAVENOUS at 09:24

## 2022-08-19 RX ADMIN — Medication 100 MG: at 07:30

## 2022-08-19 RX ADMIN — ROCURONIUM BROMIDE 10 MG: 10 INJECTION, SOLUTION INTRAVENOUS at 07:30

## 2022-08-19 RX ADMIN — FENTANYL CITRATE 50 MCG: 50 INJECTION, SOLUTION INTRAMUSCULAR; INTRAVENOUS at 09:10

## 2022-08-19 RX ADMIN — ONDANSETRON 4 MG: 2 INJECTION INTRAMUSCULAR; INTRAVENOUS at 09:35

## 2022-08-19 RX ADMIN — SODIUM CHLORIDE, POTASSIUM CHLORIDE, SODIUM LACTATE AND CALCIUM CHLORIDE: 600; 310; 30; 20 INJECTION, SOLUTION INTRAVENOUS at 06:46

## 2022-08-19 ASSESSMENT — PAIN SCALES - GENERAL
PAINLEVEL_OUTOF10: 8
PAINLEVEL_OUTOF10: 0
PAINLEVEL_OUTOF10: 6
PAINLEVEL_OUTOF10: 7
PAINLEVEL_OUTOF10: 5
PAINLEVEL_OUTOF10: 8
PAINLEVEL_OUTOF10: 6

## 2022-08-19 ASSESSMENT — PAIN DESCRIPTION - FREQUENCY
FREQUENCY: CONTINUOUS

## 2022-08-19 ASSESSMENT — PAIN DESCRIPTION - DESCRIPTORS
DESCRIPTORS: PRESSURE;SORE
DESCRIPTORS: SHARP;STABBING

## 2022-08-19 ASSESSMENT — PAIN DESCRIPTION - LOCATION
LOCATION: NOSE;THROAT
LOCATION: NOSE
LOCATION: NOSE;THROAT

## 2022-08-19 ASSESSMENT — LIFESTYLE VARIABLES: SMOKING_STATUS: 0

## 2022-08-19 ASSESSMENT — PAIN DESCRIPTION - PAIN TYPE
TYPE: SURGICAL PAIN
TYPE: SURGICAL PAIN

## 2022-08-19 ASSESSMENT — PAIN DESCRIPTION - ONSET: ONSET: AWAKENED FROM SLEEP

## 2022-08-19 NOTE — PROGRESS NOTES
Pt expressed and wants to talk to Dr. Kingston Duque post surgery. OR was called and the nurse will relay the message to Dr. Kingston Duque.

## 2022-08-19 NOTE — PROGRESS NOTES
Ambulatory Surgery/Procedure Discharge Note    Vitals:    08/19/22 1038   BP: (!) 166/99   Pulse: 78   Resp: 14   Temp: 98.1 °F (36.7 °C)   SpO2: 95%       In: 1325 [P.O.:325; I.V.:1000]  Out: 20     Restroom use offered before discharge. Yes  Pt is toileted and remains safe. Extra dressing given to the patient. Discharge instructions were read at bedside. Pt had pain meds earlier at PACU. Tolerating PO well and no nausea reported. Script altogether on chart. Pain assessment:  headaches  Pain Level: 6        Patient discharged to home/self care.  Patient discharged via wheel chair by transporter to waiting family/S.O.       8/19/2022 11:02 AM

## 2022-08-19 NOTE — OP NOTE
Operative Note        Patient Name: Yessenia Lopez  YOB: 1985  Medical Record Number:  [id number]  Billing Number:  [id number]  Date of Procedure: 8/19/2022  Time: 0730    Brief History: This is a 44 y/o male with recurrent sinonasal polyps, turbinate hypertrophy and nasal obstruction  Pre-operative Diagnosis: Chronic pansinusitis, Nasal sinus polyps, turbinate hypertrophy and nasal obstruction. Post-operative Diagnosis: Same  Proc: 1. Bilateral nasal endoscopy with maxillary antrosotomy with removal of tissue (43246-96)   2. Bilateral nasal endoscopy with totoal spheno-ethmoidectomy with removal of tissue (36183-42)   3. Bilateral nasal endoscopy with frontal sinus exploration (19588-80)   4. Bilateral nasal endoscopy with submucosal resection  of inferior turbinates. 5.Stereotactic computer assisted surgery (63137)    Circulator: Henrique Duffy RN  Scrub Person First: Dilia Ridley  Circulator Assist: Cameron Martinez RN    Anesthesia: 1. 9 cc 1% lidocaine with 1:100,000 epinephrine injected in the uncinate  and middle turbinate bilateral  EBL: 50ml  Specimens: Bilateral sinus contents  Findings: Diffuse sinonasal polyps and turbinate hypertrophy  Complications: none  Antibiotics: none    After consent was confirmed the patient came into the operating room and was placed in supine position. General IV anesthesia was obtained and the patient intubated by Anesthesiology without difficulty. The table was turned and 9 cc's of 1% lidocaine with 1:100,000 epinephrine was injected into the uncinate, middle turbinate and anterior wall of the sphenoid sinus. To allow a time for anesthesia and decongestant the patients LDR Holdingx CT was loaded onto the land ino device. The head  was placed on the patients forehead without pressure. The imaging was then registered to the patient in point to point fashion with a high degree of accuracy less than two millimeters.  Image guidance was utilized due to the revision nature of the procedure to avoid unwarranted complication   Surgery began on the right nasal cavity. Using a zero degree endoscope and and a caudal elevator the middle turbinate was medialized in its inferior third. The uncinate was in fractured with a frontal sinus seeker. A Dmitri's window was made with a back biting forceps. The uncinate was removed with a 4mm, 0 degree straight shot micro debrider in its entirety from the inferior turbinate to the skull base. The natural ostium of the maxillary sinus was identified and back-fractured through the fontanelle with a frontal sinus seeker. A large maxillary antrostomy was then performed with the 0 degree micro debrider. Polyps were removed with a 0 and 40 degree micro-debrider. A 0 degree micro-debrider was used to remove the ethmoid bulla lateral to the lamina papyracea , superior to the skull base and posterior to the basal lamella. A large window was made in the lamella preserving an inferior 1 cm strut. The posterior ethmoids were then removed with the micro debrider lateral to the lamina, posterior to the anterior wall of the sphenoid sinus and superior to the skull base. Polyps were removed with a 0 degree micro-debrider. 1:1000 topical epinephrine pledgets were occasionally placed for hemostasis. The inferior half of the superior turbinate was resected with true cut rongeurs. The natural ostium was identified with a caudal elevator, entered and down-fractured in a medial and inferior direction. A large sphenoidotomy was created with Kerrison rongeurs and the micro debrider. Polyps were removed with a 0 degree micro-debrider. 1:1000 topical epinephrine pledgets were occasionally placed for hemostasis. Using a combination of 0 and 45 degree endoscopes the agar nasi cell and frontal recess was opened into the frontal sinus with 0 and 40 degree micro debriders. This allowed full visualization into the frontal recess and sinus.  Polyps were removed with a 0 and 40 degree micro-debrider. 1:1000 topical epinephrine pledgets were placed for hemostasis. Surgery continued on the left nasal cavity. Using a zero degree endoscope and and a caudal elevator the middle turbinate was medialized in its inferior third. The uncinate was in fractured with a frontal sinus seeker. A Dmitri's window was made with a back biting forceps. The uncinate was removed with a 4mm, 0 degree straight shot micro debrider in its entirety from the inferior turbinate to the skull base. The natural ostium of the maxillary sinus was identified and back-fractured through the fontanelle with a frontal sinus seeker. A large maxillary antrostomy was then performed with the 0 degree micro debrider. Polyps were removed with a 0 and 40 degree micro-debrider. A 0 degree micro-debrider was used to remove the ethmoid bulla lateral to the lamina papyracea , superior to the skull base and posterior to the basal lamella. A large window was made in the lamella preserving an inferior 1 cm strut. The posterior ethmoids were then removed with the micro debrider lateral to the lamina, posterior to the anterior wall of the sphenoid sinus and superior to the skull base. Polyps were removed with a 0 degree micro-debrider. 1:1000 topical epinephrine pledgets were occasionally placed for hemostasis. The inferior half of the superior turbinate was resected with true cut rongeurs. The natural ostium was identified with a caudal elevator, entered and down-fractured in a medial and inferior direction. A large sphenoidotomy was created with Kerrison rongeurs and the micro debrider. Polyps were removed with a 0 degree micro-debrider. 1:1000 topical epinephrine pledgets were occasionally placed for hemostasis. Using a combination of 0 and 45 degree endoscopes the agar nasi cell and frontal recess was opened into the frontal sinus with 0 and 40 degree micro debriders.  This allowed full visualization into the frontal recess and sinus. Polyps were removed with a 0 and 40 degree micro-debrider. 1:1000 topical epinephrine pledgets were placed for hemostasis. An incision was made in the tip of the inferior turbinate on the left with a protected needle tip Bovie. A sub-periosteal plane was developed along the full length of the medial surface of the turbinate bone. A submucosal reduction was performed with a 2.9 mm turbinate micro-debrider. The turbinate bone was then in-fractured and out-fractured with a caudal elevator. An afrin soaked pledget was placed. An incision was made in the tip of the inferior turbinate on the right with a protected needle tip Bovie. A sub-periosteal plane was developed along the full length of the medial surface of the turbinate bone. A submucosal reduction was performed with a 2.9 mm turbinate micro-debrider. The turbinate bone was then in-fractured and out-fractured with a caudal elevator. An afrin soaked pledget was placed. Pledgets were removed and the wounds were thoroughly irrigated with sterile saline. Nasopore was placed between the middle turbinates and lateral sinus wall on the bilateral. Villa splints were placed. There was no further significant bleeding. The patient was then awoken from general anesthesia, extubated and sent to the PACU in stable condition. I attest that I was present for and performed the entire procedure myself.      MARLY     Electronically signed by Hanna Hernandez MD on 8/19/2022 at 8:35 AM

## 2022-08-19 NOTE — PROGRESS NOTES
PACU Transfer to [facility]    Procedure(s):  BILATERAL INFERIOR TURBINATE REDUCTION, BILATERAL MAXILLARY ANTROSTOMY WITH TISSUE REMOVAL, BILATERAL SPHENOIDOSTOMY, FRONTAL SINUS EXPLORATION AND IMAGE NAVIGATION    Pt's Current Allergies: Patient has no known allergies. Pt meets criteria to transfer to next phase of care per NESTOR SCORE and ASPAN standards    Vitals:    08/19/22 1000   BP: (!) 150/90   Pulse: 63   Resp: 10   Temp: 97.4 °F (36.3 °C)   SpO2: 98%      BP within 20% of pt's admitting BP as per Nestor Score      Intake/Output Summary (Last 24 hours) at 8/19/2022 1021  Last data filed at 8/19/2022 1000  Gross per 24 hour   Intake 1325 ml   Output 20 ml   Net 1305 ml       Pain assessment:  present - adequately treated  Pain Level: 5    Patient was assessed for unknown alterations to skin integrity. There were not unknown alterations observed. Patient transferred to care of Weston Amado RN.    Family updated and directed to Weston Amado    8/19/2022 10:21 AM

## 2022-08-19 NOTE — H&P
[facility]    [id number]    [facility] Same Day Surgery Update H & P  Department of General Surgery   Surgical Service   Pre-operative History and Physical  Last H & P within the last 30 days. DIAGNOSIS:   Chronic pansinusitis [J32.4]  Nasal turbinate hypertrophy [J34.3]  Nasal sinus polyp [J33.8]  Nasal obstruction [J34.89]    Procedure(s):  BILATERAL INFERIOR TURBINATE REDUCTION, BILATERAL MAXILLARY ANTROSTOMY WITH TISSUE REMOVAL, BILATERAL SPHENOIDOSTOMY, FRONTAL SINUS EXPLORATION AND IMAGE NAVIGATION    History obtained from: Patient interview and EHR      HISTORY OF PRESENT ILLNESS:   The patient is a 43 y.o. male with c/o with c/o congestion and nasal obstruction in the setting of chronic pansinusitis, nasal turbinate hypertrophy and nasal sinus polyps. Their symptoms have been unrelieved with conservative therapy and they presents today for the above procedure. Illness Screening: Patient denies fever, chills, worsening cough, or close contact with sick individuals. Past Medical History:        Diagnosis Date    ADHD (attention deficit hyperactivity disorder)     dxed at [facility] HE BELIEVES BP HIGH AFTERWARDS    Asthma     Depression     dxed with depression    Hyperlipidemia     Meningitis 01/01/2013    LEIGHTON (obstructive sleep apnea)     NOT ON CPAP    Prolonged emergence from general anesthesia      Past Surgical History:        Procedure Laterality Date    SINUS SURGERY  01/01/2008    Polyps, Sinus Re-construction, Septum    SKIN SURGERY      PRE CANCEROUS MOLES         Medications Prior to Admission:      Prior to Admission medications    Medication Sig Start Date End Date Taking?  Authorizing Provider   Fluticasone Furoate-Vilanterol (BREO ELLIPTA IN) Inhale into the lungs   Yes Historical Provider, MD   aspirin-acetaminophen-caffeine (Sanna Bergman) 705819-27 MG per tablet Take by mouth    Historical Provider, MD amphetamine-dextroamphetamine (ADDERALL) 20 MG tablet  8/15/22   Historical Provider, MD   Oxymetazoline HCl (NASAL SPRAY) 0.05 % SOLN 2 sprays by Nasal route 2 times daily    Historical Provider, MD   sodium chloride (OCEAN) 0.65 % nasal spray 1 spray by Nasal route as needed    Historical Provider, MD   VENTOLIN  (90 BASE) MCG/ACT inhaler INHALE 2 PUFFS BY MOUTH FOUR TIMES DAILY AS NEEDED 7/22/16   Hubert Hicks DO   montelukast (SINGULAIR) 10 MG tablet Take 10 mg by mouth nightly. Historical Provider, MD   Multiple Vitamins-Minerals (MULTIVITAMIN PO) Take  by mouth. Historical Provider, MD   ibuprofen (ADVIL;MOTRIN) 200 MG tablet Take 200 mg by mouth every 6 hours as needed. Historical Provider, MD         Allergies:  Patient has no known allergies. PHYSICAL EXAM:      /75   Pulse 61   Temp 97.8 °F (36.6 °C) (Temporal)   Resp 18   Ht 5' 8\" (1.727 m)   Wt 175 lb (79.4 kg)   SpO2 99%   BMI 26.61 kg/m²      Airway:  Airway patent with no audible stridor    Heart:  Regular rate and rhythm, No murmur noted    Lungs:  No increased work of breathing, good air exchange, clear to auscultation bilaterally, no crackles or wheezing    Abdomen:  Soft, non-distended, non-tender, no rebound tenderness or guarding, and no masses palpated    ASSESSMENT AND PLAN     Patient is a 43 y.o. male with above specified procedure planned. 1.  The patients history and physical was obtained and signed off by the pre-admission testing department. Patient seen and focused exam done today- no new changes since last physical exam on 8/15/22    2. Access to ancillary services are available per request of the provider.     KHANH Torres - CNP     8/19/2022

## 2022-08-19 NOTE — ANESTHESIA PRE PROCEDURE
Department of Anesthesiology  Preprocedure Note       Name:  Davon Gillespie   Age:  43 y.o.  :  1985                                          MRN:  [id number]         Date:  2022      Surgeon: Rahul Gr):  Yesica Antunez MD    Procedure: Procedure(s):  BILATERAL INFERIOR TURBINATE REDUCTION, BILATERAL MAXILLARY ANTROSTOMY WITH TISSUE REMOVAL, BILATERAL SPHENOIDOSTOMY, FRONTAL SINUS EXPLORATION AND IMAGE NAVIGATION    Medications prior to admission:   Prior to Admission medications    Medication Sig Start Date End Date Taking? Authorizing Provider   Fluticasone Furoate-Vilanterol (BREO ELLIPTA IN) Inhale into the lungs   Yes Historical Provider, MD   aspirin-acetaminophen-caffeine (Christopher Flood) [phone] MG per tablet Take by mouth    Historical Provider, MD   amphetamine-dextroamphetamine (ADDERALL) 20 MG tablet  8/15/22   Historical Provider, MD   Oxymetazoline HCl (NASAL SPRAY) 0.05 % SOLN 2 sprays by Nasal route 2 times daily    Historical Provider, MD   sodium chloride (OCEAN) 0.65 % nasal spray 1 spray by Nasal route as needed    Historical Provider, MD   VENTOLIN  (90 BASE) MCG/ACT inhaler INHALE 2 PUFFS BY MOUTH FOUR TIMES DAILY AS NEEDED 16   Hubert Hicks DO   montelukast (SINGULAIR) 10 MG tablet Take 10 mg by mouth nightly. Historical Provider, MD   Multiple Vitamins-Minerals (MULTIVITAMIN PO) Take  by mouth. Historical Provider, MD   ibuprofen (ADVIL;MOTRIN) 200 MG tablet Take 200 mg by mouth every 6 hours as needed.     Historical Provider, MD       Current medications:    Current Facility-Administered Medications   Medication Dose Route Frequency Provider Last Rate Last Admin    lidocaine PF 1 % injection 1 mL  1 mL IntraDERmal Once PRN Arcelia Black MD        lactated ringers infusion   IntraVENous Continuous Arcelia Black  mL/hr at 22 0646 New Bag at 22 0646    sodium chloride flush 0.9 % injection 5-40 mL  5-40 mL IntraVENous 2 times per day Deng Ervin MD        sodium chloride flush 0.9 % injection 5-40 mL  5-40 mL IntraVENous PRN Deng Ervin MD        0.9 % sodium chloride infusion   IntraVENous PRN Deng Ervin MD           Allergies:  No Known Allergies    Problem List:    Patient Active Problem List   Diagnosis Code    Asthma J45.909    Intractable chronic migraine without aura G43.719    Medication overuse headache G44.40    H/O viral meningitis Z86.61    Cervicalgia M54.2    Bilateral carpal tunnel syndrome G56.03    Peroneal neuropathy G57.30    Depression F32. A    ADHD (attention deficit hyperactivity disorder) F90.9       Past Medical History:        Diagnosis Date    ADHD (attention deficit hyperactivity disorder)     dxed at [address] [place] [address] HE BELIEVES BP HIGH AFTERWARDS    Asthma     Depression     dxed with depression    Hyperlipidemia     Meningitis 01/01/2013    LEIGHTON (obstructive sleep apnea)     NOT ON CPAP    Prolonged emergence from general anesthesia        Past Surgical History:        Procedure Laterality Date    SINUS SURGERY  01/01/2008    Polyps, Sinus Re-construction, Septum    SKIN SURGERY      PRE CANCEROUS MOLES       Social History:    Social History     Tobacco Use    Smoking status: Never    Smokeless tobacco: Never   Substance Use Topics    Alcohol use: Yes     Comment: ONCE A WEEK                                Counseling given: Not Answered      Vital Signs (Current):   Vitals:    08/11/22 0833 08/19/22 0621   BP:  120/75   Pulse:  61   Resp:  18   Temp:  97.8 °F (36.6 °C)   TempSrc:  Temporal   SpO2:  99%   Weight: 175 lb (79.4 kg)    Height: 5' 8\" (1.727 m)                                               BP Readings from Last 3 Encounters:   08/19/22 120/75   08/08/22 113/69   11/05/15 123/86       NPO Status: Time of last liquid consumption: 2230                        Time of last solid consumption: 2230                        Date of last liquid consumption: 08/18/22                        Date of last solid food consumption: 08/18/22    BMI:   Wt Readings from Last 3 Encounters:   08/11/22 175 lb (79.4 kg)   08/08/22 169 lb (76.7 kg)   11/05/15 169 lb 9.6 oz (76.9 kg)     Body mass index is 26.61 kg/m². CBC: No results found for: WBC, RBC, HGB, HCT, MCV, RDW, PLT    CMP: No results found for: NA, K, CL, CO2, BUN, CREATININE, GFRAA, AGRATIO, LABGLOM, GLUCOSE, GLU, PROT, CALCIUM, BILITOT, ALKPHOS, AST, ALT    POC Tests: No results for input(s): POCGLU, POCNA, POCK, POCCL, POCBUN, POCHEMO, POCHCT in the last 72 hours.     Coags: No results found for: PROTIME, INR, APTT    HCG (If Applicable): No results found for: PREGTESTUR, PREGSERUM, HCG, HCGQUANT     ABGs: No results found for: PHART, PO2ART, GQK8MBE, LBN0NSZ, BEART, W7QHBWPN     Type & Screen (If Applicable):  No results found for: LABABO, LABRH    Drug/Infectious Status (If Applicable):  No results found for: HIV, HEPCAB    COVID-19 Screening (If Applicable): No results found for: COVID19        Anesthesia Evaluation   history of anesthetic complications (slow to wake, had high bp upon waking):   Airway: Mallampati: I  TM distance: >3 FB   Neck ROM: full  Mouth opening: > = 3 FB   Dental: normal exam         Pulmonary: breath sounds clear to auscultation  (+) sleep apnea: on CPAP,  asthma (uses rescue inhaler inhaler a couple times a week, last hosp as a child, uses daily inhalers.):     (-) not a current smoker                           Cardiovascular:  Exercise tolerance: good (>4 METS),       (-) hypertension, past MI, CAD, CABG/stent, dysrhythmias,  angina,  CHF and orthopnea    ECG reviewed  Rhythm: regular  Rate: normal           Beta Blocker:  Not on Beta Blocker         Neuro/Psych:   (+) neuromuscular disease (peroneal neuropathy, cervicalgia.):, headaches (rare migraines):, depression/anxiety    (-) seizures, TIA and CVA           GI/Hepatic/Renal:        (-) GERD, hepatitis, liver

## 2022-08-19 NOTE — ANESTHESIA POSTPROCEDURE EVALUATION
Department of Anesthesiology  Postprocedure Note    Patient: Gae Libman  MRN: [id number]  YOB: 1985  Date of evaluation: 8/19/2022      Procedure Summary     Date: 08/19/22 Room / Location: [facility] OR [address] [place] [address]    Anesthesia Start: 0175 Anesthesia Stop: 4264    Procedure: BILATERAL INFERIOR TURBINATE REDUCTION, BILATERAL MAXILLARY ANTROSTOMY WITH TISSUE REMOVAL, BILATERAL SPHENOIDOSTOMY, FRONTAL SINUS EXPLORATION AND IMAGE NAVIGATION (Bilateral) Diagnosis:       Chronic pansinusitis      Nasal turbinate hypertrophy      Nasal sinus polyp      Nasal obstruction      (Chronic pansinusitis [J32.4])      (Nasal turbinate hypertrophy [J34.3])      (Nasal sinus polyp [J33.8])      (Nasal obstruction [J34.89])    Surgeons: Chantal Vann MD Responsible Provider: Iris Howell MD    Anesthesia Type: general ASA Status: 2          Anesthesia Type: No value filed.     Whitney Phase I: Whitney Score: 10    Whitney Phase II: Whitney Score: 10      Anesthesia Post Evaluation    Patient location during evaluation: PACU  Level of consciousness: awake and alert  Pain score: 5  Airway patency: patent  Nausea & Vomiting: no vomiting  Complications: no  Cardiovascular status: blood pressure returned to baseline  Respiratory status: acceptable  Hydration status: euvolemic  Multimodal analgesia pain management approach

## 2022-08-19 NOTE — BRIEF OP NOTE
Brief Postoperative Note      Patient: La Hills  YOB: 1985  MRN: [id number]    Date of Procedure: 8/19/2022    Pre-Op Diagnosis: Chronic pansinusitis [J32.4]  Nasal turbinate hypertrophy [J34.3]  Nasal sinus polyp [J33.8]  Nasal obstruction [J34.89]    Post-Op Diagnosis: Same       Procedure(s):  BILATERAL INFERIOR TURBINATE REDUCTION, BILATERAL MAXILLARY ANTROSTOMY WITH TISSUE REMOVAL, BILATERAL SPHENOIDOSTOMY, FRONTAL SINUS EXPLORATION AND IMAGE NAVIGATION, Bilateral totoal ethmoidectomy    Surgeon(s):  Johnathon Asher MD    Assistant:  * No surgical staff found *    Anesthesia: General    Estimated Blood Loss (mL): 50    Complications: None    Specimens:   * No specimens in log *    Implants:  * No implants in log *      Drains: * No LDAs found *    Findings: Bilateral diffuse sinonasal polyps.  Turbinate hypertrophy    Electronically signed by Alejandra Fan MD on 8/19/2022 at 8:34 AM

## 2022-08-19 NOTE — PROGRESS NOTES
Procedure(s):  BILATERAL INFERIOR TURBINATE REDUCTION, BILATERAL MAXILLARY ANTROSTOMY WITH TISSUE REMOVAL, BILATERAL SPHENOIDOSTOMY, FRONTAL SINUS EXPLORATION AND IMAGE NAVIGATION    Current Allergies: Patient has no known allergies. Admitted to PACU bed 17 from OR. Arrived on a stretcher . Attached to PACU monitoring system. Alarms and parameters set. Report received from anesthesia personnel. Reported pt received epinephrine intranasally during surgery  OR staff did not report skin issues that were observed while in OR  Pt arrived with oxygen per nasal cannula with oxygen at 3 liters. Athrombic wraps in place.

## 2022-08-22 ENCOUNTER — OFFICE VISIT (OUTPATIENT)
Dept: ENT CLINIC | Age: 37
End: 2022-08-22
Payer: COMMERCIAL

## 2022-08-22 DIAGNOSIS — J33.8 NASAL SINUS POLYP: Primary | ICD-10-CM

## 2022-08-22 DIAGNOSIS — J32.4 CHRONIC PANSINUSITIS: ICD-10-CM

## 2022-08-22 PROCEDURE — 99024 POSTOP FOLLOW-UP VISIT: CPT | Performed by: OTOLARYNGOLOGY

## 2022-08-22 PROCEDURE — 31237 NSL/SINS NDSC SURG BX POLYPC: CPT | Performed by: OTOLARYNGOLOGY

## 2022-08-22 RX ORDER — OXYCODONE HYDROCHLORIDE 5 MG/1
5 TABLET ORAL EVERY 6 HOURS PRN
Qty: 15 TABLET | Refills: 0 | Status: SHIPPED | OUTPATIENT
Start: 2022-08-22 | End: 2022-08-25

## 2022-08-22 RX ORDER — OXYCODONE HYDROCHLORIDE 5 MG/1
5 TABLET ORAL EVERY 6 HOURS PRN
Qty: 10 TABLET | Refills: 0 | Status: SHIPPED | OUTPATIENT
Start: 2022-08-22 | End: 2022-08-22 | Stop reason: SDUPTHER

## 2022-08-22 NOTE — PROGRESS NOTES
S/P FESS and turbinate reduction. Some bleeding over weekend. Complete obstruction    PE: Splints intact. Removed. Debridement performed. Nasal Endoscopy with Debridement  Pre Op Dx: Nasal congestion, post operative sinus surgery  Post Op Dx: Same  Procedure: Nasal endoscopy with debridement bilateral  Anesthesia: 2% lidocaine with afrin tiopical  EBL: None  Specimens: None  Findings: lboth sides nasal cavity and sinus crusting and mucus. Procedure:    After the application of afrin and pontocaine the nasal sinus cavity was debrided utilizing a zero degree jenae endoscope with Kerrison rongeurs and mcdermott suctions on both sides. The sinus lining was healing well. No evidence of bleeding or rhinorrhea was noted. Tolerated well. Complications: None    Nasal saline sprays and rinses. Follow up in 3 weeks.

## 2022-08-26 DIAGNOSIS — J32.4 CHRONIC PANSINUSITIS: Primary | ICD-10-CM

## 2022-08-26 RX ORDER — AMOXICILLIN AND CLAVULANATE POTASSIUM 875; 125 MG/1; MG/1
1 TABLET, FILM COATED ORAL 2 TIMES DAILY
Qty: 20 TABLET | Refills: 0 | Status: SHIPPED | OUTPATIENT
Start: 2022-08-26 | End: 2022-09-05

## 2022-09-12 ENCOUNTER — OFFICE VISIT (OUTPATIENT)
Dept: ENT CLINIC | Age: 37
End: 2022-09-12
Payer: COMMERCIAL

## 2022-09-12 DIAGNOSIS — J32.4 CHRONIC PANSINUSITIS: Primary | ICD-10-CM

## 2022-09-12 DIAGNOSIS — J33.8 NASAL SINUS POLYP: ICD-10-CM

## 2022-09-12 PROCEDURE — 31237 NSL/SINS NDSC SURG BX POLYPC: CPT | Performed by: OTOLARYNGOLOGY

## 2022-09-12 NOTE — PROGRESS NOTES
S/P FESS and turbinates. Patient states still some congestion and green crusts. Overall better. Sense of smell returned. PE: Middle meatal mucus crusting and blood clots. Nasal Endoscopy with Debridement  Pre Op Dx: Nasal congestion, post operative sinus surgery  Post Op Dx: Same  Procedure: Nasal endoscopy with debridement bilateral  Anesthesia: 2% lidocaine with afrin tiopical  EBL: None  Specimens: None  Findings: both sides nasal cavity and sinus crusting and mucus. Procedure:    After the application of afrin and pontocaine the nasal sinus cavity was debrided utilizing a zero degree jenae endoscope with Kerrison rongeurs and mcdermott suctions on both sides. The sinus lining was healing well. No evidence of bleeding or rhinorrhea was noted. Tolerated well.     Complications: None

## 2022-09-14 RX ORDER — PREDNISONE 10 MG/1
TABLET ORAL
Qty: 38 TABLET | Refills: 0 | Status: SHIPPED | OUTPATIENT
Start: 2022-09-14

## 2022-11-14 ENCOUNTER — OFFICE VISIT (OUTPATIENT)
Dept: ENT CLINIC | Age: 37
End: 2022-11-14
Payer: COMMERCIAL

## 2022-11-14 VITALS
DIASTOLIC BLOOD PRESSURE: 75 MMHG | SYSTOLIC BLOOD PRESSURE: 119 MMHG | BODY MASS INDEX: 28.34 KG/M2 | HEIGHT: 68 IN | HEART RATE: 82 BPM | WEIGHT: 187 LBS

## 2022-11-14 DIAGNOSIS — J32.4 CHRONIC PANSINUSITIS: Primary | ICD-10-CM

## 2022-11-14 DIAGNOSIS — J33.8 NASAL SINUS POLYP: ICD-10-CM

## 2022-11-14 PROCEDURE — 31231 NASAL ENDOSCOPY DX: CPT | Performed by: OTOLARYNGOLOGY

## 2022-11-14 PROCEDURE — 99024 POSTOP FOLLOW-UP VISIT: CPT | Performed by: OTOLARYNGOLOGY

## 2022-11-14 NOTE — PROGRESS NOTES
Here for 2 month follow up sinus surgery for CRS with polyps. Patient states right sided feels swollen. PE: Open airway. Due to the patients chronic sinus disease and/or history of sinonasal neoplasm for surveillance a nasal endoscopy with or without debridement will be performed to complete a significant physical examination of the patient which cannot be performed by anterior rhinoscopy alone (failure of complete examination of the paranasal sinuses). Failure to provide this procedure may lead to late detection of significant chronic benign disease, acute exacerbation, resolution or failure of early diagnosis of recurrent cancer. The procedure report is present in the body of the chart. Nasal Endoscopy    Pre OP: CRS with polyps  Post OP: No evidence of sinus blockage  Reason: COngestion  Procedure: Nasal endoscopy  Surgeon: Anat Jensen  Anesthesia: Afrin with 2% lidocaine  Estimated Blood Loss: None      After obtaining verbal consent from the patient 1% lidocaine with afrin was sprayed into the nasal cavities. After allowing a time for anesthesia, a nasal endoscope was placed into the nostril. The septum, inferior, and middle turbinates were examined. The middle meatus, and sphenoethmoid recess was examined bilaterally. Cultures were not obtained from the sinuses. There were no complications. Pertinent positives included: There was not edema and purulence in the left middle meatus. There was not edema and purulence at the right middle meatus. Polyps were not identified in the sinuses. Masses were not identified. Tolerated well without complication. I attest that I was present for and did the entire procedure myself. Referral to [facility]. Follow up in 3 months.

## 2023-03-14 ENCOUNTER — OFFICE VISIT (OUTPATIENT)
Dept: ENT CLINIC | Age: 38
End: 2023-03-14
Payer: COMMERCIAL

## 2023-03-14 VITALS
DIASTOLIC BLOOD PRESSURE: 66 MMHG | TEMPERATURE: 97 F | HEIGHT: 68 IN | SYSTOLIC BLOOD PRESSURE: 137 MMHG | BODY MASS INDEX: 27.83 KG/M2 | WEIGHT: 183.6 LBS | HEART RATE: 77 BPM

## 2023-03-14 DIAGNOSIS — J33.8 NASAL SINUS POLYP: ICD-10-CM

## 2023-03-14 DIAGNOSIS — J32.4 CHRONIC PANSINUSITIS: Primary | ICD-10-CM

## 2023-03-14 PROCEDURE — 31231 NASAL ENDOSCOPY DX: CPT | Performed by: OTOLARYNGOLOGY

## 2023-03-14 RX ORDER — FLUTICASONE PROPIONATE 50 MCG
1 SPRAY, SUSPENSION (ML) NASAL DAILY
COMMUNITY

## 2023-03-14 NOTE — PROGRESS NOTES
Patient here for follow up CRS with polyps. Needs referral to Allergy for Dupent. Had recent sinus infection and admission for asthma. PE: Clear nasal airway. Due to the patients chronic sinus disease and/or history of sinonasal neoplasm for surveillance a nasal endoscopy with or without debridement will be performed to complete a significant physical examination of the patient which cannot be performed by anterior rhinoscopy alone (failure of complete examination of the paranasal sinuses). Failure to provide this procedure may lead to late detection of significant chronic benign disease, acute exacerbation, resolution or failure of early diagnosis of recurrent cancer. The procedure report is present in the body of the chart. Nasal Endoscopy    Pre OP: CRS with polyps  Post OP: No current polypoid disease. Reason: Surveillance with recent infections  Procedure: Nasal endoscopy  Surgeon: Loni Peace  Anesthesia: Afrin with 2% lidocaine  Estimated Blood Loss: None      After obtaining verbal consent from the patient 1% lidocaine with afrin was sprayed into the nasal cavities. After allowing a time for anesthesia, a nasal endoscope was placed into the nostril. The septum, inferior, and middle turbinates were examined. The middle meatus, and sphenoethmoid recess was examined bilaterally. Cultures were not obtained from the sinuses. There were no complications. Pertinent positives included: There was not edema and purulence in the left middle meatus. There was not edema and purulence at the right middle meatus. Polyps were not identified in the sinuses. Masses were not identified. Tolerated well without complication. I attest that I was present for and did the entire procedure myself. A/P: Doing well s/p prednisone for sinus and asthma. Referral to [facility] for biologics. Follow up in 6 months.

## 2023-09-18 ENCOUNTER — OFFICE VISIT (OUTPATIENT)
Dept: ENT CLINIC | Age: 38
End: 2023-09-18
Payer: COMMERCIAL

## 2023-09-18 VITALS
TEMPERATURE: 98.9 F | SYSTOLIC BLOOD PRESSURE: 129 MMHG | HEART RATE: 64 BPM | DIASTOLIC BLOOD PRESSURE: 80 MMHG | BODY MASS INDEX: 26.58 KG/M2 | HEIGHT: 68 IN | WEIGHT: 175.4 LBS

## 2023-09-18 DIAGNOSIS — J33.8 NASAL SINUS POLYP: ICD-10-CM

## 2023-09-18 DIAGNOSIS — J32.4 CHRONIC PANSINUSITIS: Primary | ICD-10-CM

## 2023-09-18 PROCEDURE — 31231 NASAL ENDOSCOPY DX: CPT | Performed by: OTOLARYNGOLOGY

## 2023-09-18 NOTE — PROGRESS NOTES
CC: CRS and polyps follow up. HPI: Doing well. Some congestion. PE: Nasal cavity clear. Due to the patients chronic sinus disease and/or history of sinonasal neoplasm for surveillance a nasal endoscopy with or without debridement will be performed to complete a significant physical examination of the patient which cannot be performed by anterior rhinoscopy alone (failure of complete examination of the paranasal sinuses). Failure to provide this procedure may lead to late detection of significant chronic benign disease, acute exacerbation, resolution or failure of early diagnosis of recurrent cancer. The procedure report is present in the body of the chart. Nasal Endoscopy    Pre OP: CRS with polyps  Post OP: Minimal recurrence  Reason: Surveillance  Procedure: Nasal endoscopy  Surgeon: Yolis Jones  Anesthesia: Afrin with 2% lidocaine  Estimated Blood Loss: None      After obtaining verbal consent from the patient 1% lidocaine with afrin was sprayed into the nasal cavities. After allowing a time for anesthesia, a nasal endoscope was placed into the nostril. The septum, inferior, and middle turbinates were examined. The middle meatus, and sphenoethmoid recess was examined bilaterally. Cultures were not obtained from the sinuses. There were no complications. Pertinent positives included: There was not edema and purulence in the left middle meatus. There was not edema and purulence at the right middle meatus. Polyps were identified in the ethmoid sinuses. MInimal. Masses were not identified. Tolerated well without complication. I attest that I was present for and did the entire procedure myself.     A/P: Follow up in 6 months   Referral to [facility] for possible biologics with Asthma and polyps

## 2023-12-07 NOTE — DISCHARGE INSTRUCTIONS
Discharge Instructions for Functional Endoscopic Sinus Surgery    Functional endoscopic sinus surgery is a procedure to enlarge pathways to the sinuses. It is done to improve sinus drainage and may help to treat recurring sinus problems and infections. Recovery from this surgery takes about 1 weeks. Steps to [facility]   While your sinuses are healing:   Do not blow your nose until your doctor says it is okay to do so. This is usually after 48 hours. You may have bloody discharge from your nose. Create a drip pad using rolls of gauze. Hold the roll under your nose to soak up any discharge. Avoid air pollutants like dust and smoke. Physical Activity   During your recovery:   Avoid swimming in oceans and lakes for 2 weeks. Get plenty of rest for at least a 2-3 days. If your job involves heavy lifting, you may need to stay out of work up to 1 week. Ask your doctor when you will be able to return to work. Do not drive unless your doctor has given you permission to do so. Medications   Your doctor may advise:   Over-the-counter pain medication. Saline spray to moisturize the nose and clear nasal crusting. Two sprays every two hours while awake. Afrin nasal spray. Two sprays each nostril three times a day for three days then stop. Follow these general medication guidelines:   Take your medication as directed. Do not change the amount or schedule. Tylenol 1000 mg (two 500 mg tablets) by mouth every 6 hours for pain. Ibuprofen (Advil or Motrin) 600 mg (three 200 mg tablets) by mouth every 6 hours for pain. Alternate these two medications every three hours. Oxycodone, 5 mg tablets. Take one tablet every 6 hours as needed for pain. Ask what side effects could occur. Report them to your doctor. Talk to your doctor before you stop taking the medication. Do not share your medication with anyone. Medications can be dangerous when mixed.  Talk to your doctor if you are taking more than one medication, including over-the-counter products and supplements. If you had to stop medications before surgery, ask your doctor when you can start again. Follow-up  You will need to be seen by your doctor 1week after surgery. If you had splints put in your nose during surgery, they will be taken out at your follow-up visit. It is important to go to any recommended appointments. - MAKE APPOINTMENT FOR Tuesday AUGUST 23  Call Your Doctor If Any of the Following Occur ([phone])  Contact your doctor if your recovery is not progressing as expected or you develop complications, such as:  Signs of infection, including fever and chills  Pain that you cannot control with the medications that you have been given  Redness, swelling, increasing pain, excessive bleeding, or discharge from the nose  Lightheadedness  Nausea and vomiting  Vomiting blood or material that looks like coffee grounds  Bruising around the eye(s)  Swelling of the eye(s)  Changes in vision  A headache that lasts longer than 2 days after surgery    If you think you have an emergency, call for medical help right away. 8/19/2022      [facility] AMBULATORY PROCEDURE DISCHARGE INSTRUCTIONS    There are potential side effects of anesthesia or sedation you may experience for the first 24 hours. These side effects include:    Confusion or Memory loss, Dizziness, or Delayed Reaction Times   [x]A responsible person should be with you for the next 24 hours. Do not operate any vehicles (automobiles, bicycles, motorcycles) or power tools or machinery for 24 hours. Do not sign any legal documents or make any legal decisions for 24 hours. Do not drink alcohol for 24 hours or while taking narcotic pain medication. Nausea/Diet  [x] Nausea is not uncommon after having general anesthesia. Start with light diet and progress to your normal diet as you feel like eating.  However, if you experience nausea or repeated episodes of vomiting which persist beyond 12-24 hours, notify your physician. Once nausea has passed, remember to keep drinking fluids. Difficulty Passing Urine  [x]Drink extra amounts of fluid today. Notify your physician if you have not urinated within 8 hours after your procedure or you feel uncomfortable. Irritated Throat from a Breathing Tube  [x]Drink extra amounts of fluid today. Lozenges may help. Muscle Aches  [x]You may experience some generalized body aches as your muscles recover from medications used to relax them during surgery. These will gradually subside. ACTIVITY INSTRUCTIONS:  [x]Rest today. Increase activity as tolerated. [x]No heavy lifting or strenuous activity  []No driving until cleared by your surgeon  [x]No driving while on narcotic pain medications or for 24 hours. Otherwise, you can drive when you can sit comfortably behind the steering wheel and can slam on the brake without it hurting or turn the wheel sharply without it hurting. Practice this while sitting in the car with it parked in your driveway before trying to drive. POST OPERATION WOUND CARE INSTRUCTIONS:       Follow instructions as instructed verbally and written by your Surgeon. Call Doctor for any questions or concerns at their office number. Someone will answer the phone 24hrs/7 days a week. MEDICATION INSTRUCTIONS:  Prescription(S) x  1  were sent home with  you to be filled. Use as directed. Refer to your pharmacy's medication information sheets or your pharmacist for any questions you may have about the medicines prescribed. When taking pain medications, you may experience the side effect of dizziness or drowsiness. Do not drink alcohol or drive when taking these medications.   If no prescriptions were sent home with you, you may take over the counter medications as needed for your discomfort unless your doctor directs differently    [x] You may resume all medications you were taking before surgery  [x] Give the list of your medications to your primary care physician on your next visit. Keep your med list updated and carry it with in case of emergencies. [x] If you take any blood thinning medication, such as Coumadin or Plavix, check with your surgeon on when to re-start taking it. [x] Narcotic pain medications can cause significant constipation. You may want to add a stool softener to your postoperative medication schedule or speak to your surgeon on how best to manage this side effect. NARCOTIC SAFETY:  Your pain medicine is only for you to take. Safely store your medicines. Store pills up high and out of reach of children and pets. Ensure safety caps are snapped tightly  Keep track of how many pills you have left    Unused medication can be disposed of by taking them to a drop-off box or take-back program that is authorized by the [facility]. Access to a site near you can be found on the [facility] [employer] website ([address]. [facility] [email or web address] If you have a CPAP machine, it is very important that you use it daily during all periods of sleep and daytime rest during your recovery at home. Surgery and Anesthesia place a significant amount of stress on your body. Using your CPAP will help keep you safe and lessen the negative effects of that stress. If you smoke STOP. Smoking can interfere with healing and your respiratory health after surgery. We care about your health! Watch for these significant complications. Call physician if they or any other problems occur:  Fever over 101 F°    Redness, swelling, hardness or warmth at the operative site  Unrelieved nausea    Foul smelling or cloudy drainage at the operative site   Unrelieved pain after taking medications as prescribed by your doctor    Blood soaked dressing.  (Some oozing may be normal)  Numb, pale, blue, cold or tingling extremity  You have prolonged anesthesia/sedation side effects          FAQs  (frequently asked questions)  About Surgical Site Infections    What is a Surgical Site Infection (SSI)? A surgical site infection is an infection that occurs after surgery in the part of the body where the surgery took place. Most patients who have surgery do not develop an infection. However, infections develop in about 1 to 3 out of every 100 patients who have surgery. Some common symptoms of a surgical site infection are:   Redness and pain around the area where you had surgery  Drainage of cloudy fluid from your surgical wound   Fever    Can SSIs be treated? Yes. Most surgical site infections can be treated with antibiotics. The antibiotic given to you depends on the bacteria (germs) causing the infection. Sometimes patients with SSIs also need another surgery to treat the infection. What are some of the things that hospitals are doing to prevent SSIs? To prevent SSIs, doctors, nurses and other healthcare providers:   Clean their hands and arms up to their elbows with an antiseptic agent just before the surgery. Clean their hands with soap and water or an alcohol-based hand rub before and after caring for each patient. May remove some of your hair immediately before your surgery using electric clippers if the hair is in the same area where the procedure will occur. They should not shave you with a razor. Wear special hair covers, masks, gowns, and gloves during surgery to keep the surgery area clean. Give you antibiotics before your surgery starts. In most cases, you should get antibiotics within 60 minutes before the surgery starts and the antibiotics should be stopped within 24 hours after surgery. Clean the skin at the site of your surgery with a special soap that kills germs. What can I do to help prevent SSIs? Before you surgery:  Tell your doctor about other medical problems you may have. Health problems such as allergies, diabetes, and obesity could affect your surgery and your treatment. Quit smoking.   Patients who smoke get more infections. Talk to your doctor about how you can quit before your surgery. Do not shave near where you will have surgery. Shaving with a razor can irritate your skin and make it easier to develop an infection. At the time of your surgery:  Speak up if someone tries to shave you with a razor before surgery. Ask why you need to be shaved and talk with your surgeon if you have any concerns. Ask if you will get antibiotics before surgery. After your surgery:  Make sure that your healthcare providers clean their hands before examining you, either with soap and water or an alcohol-based hand rub. IF YOU DO NOT SEE YOUR PROVIDERS CLEAN THEIR HANDS, PLEASE ASK THEM TO DO SO. Family and friends who visit you should not touch the surgical wound or dressings. Family and friends should clean their hands with soap and water or an alcohol-based hand rub before and after visiting you. If you do not see them clean their hands, ask them to clean their hands. What do I need to do when I go home from the hospital?  Before you go home, your doctor nurses should explain everything you need to know about taking care of your wound. Make sure you understand how to care for your wound before you leave the hospital.    Always clean your hands before and after caring for your wound. Before you go home, make sure you know who to contact if you have questions or problems after you get home. If you have any symptoms of an infection, such as redness and pain at the surgery site, drainage, or fever, call your doctor immediately. If you have additional questions, please ask your doctor or nurse. 11/22/23

## (undated) DEVICE — SOLUTION IV 1000ML 0.9% SOD CHL

## (undated) DEVICE — FIRM 8CM: Brand: NASOPORE

## (undated) DEVICE — SPLINT 1524055 DOYLE II AIRWAY SET: Brand: DOYLE II ™

## (undated) DEVICE — TUBING 1895522 5PK STRAIGHTSHOT TO XPS: Brand: STRAIGHTSHOT®

## (undated) DEVICE — DRAPE,INSTRUMENT,MAGNETIC,10X16: Brand: MEDLINE

## (undated) DEVICE — GLOVE ORANGE PI 7 1/2   MSG9075

## (undated) DEVICE — TOWEL,STOP FLAG GOLD N-W: Brand: MEDLINE

## (undated) DEVICE — INSTRUMENT TRACKER 9733533XOM ENT 1PK

## (undated) DEVICE — SPECIMEN SOCK - FEMALE: Brand: MEDI-VAC

## (undated) DEVICE — ELECTRODE PT RET AD L9FT HI MOIST COND ADH HYDRGEL CORDED

## (undated) DEVICE — NEEDLE SPNL 25GA L3.5IN BLU HUB S STL REG WALL FIT STYL W/

## (undated) DEVICE — BLADE 1882940HR 5PK M4 INF TURB 2.9MM: Brand: STRAIGHTSHOT

## (undated) DEVICE — SYRINGE IRRIG 60ML SFT PLIABLE BLB EZ TO GRP 1 HND USE W/

## (undated) DEVICE — SHEATH 1912000 5PK 4MM/0DEG STORZ XOMED: Brand: ENDO-SCRUB®

## (undated) DEVICE — PATIENT TRACKER 9734887XOM NON-INVASIVE

## (undated) DEVICE — BLADE 1884006EM RAD40 4MM M4 ROTATE ROHS: Brand: FUSION®

## (undated) DEVICE — DRAPE IRRIG FLD WRM W44XL44IN W/ AORN STD PRTBL INTRATEMP

## (undated) DEVICE — NASAL FESS: Brand: MEDLINE INDUSTRIES, INC.

## (undated) DEVICE — TOWEL,OR,DSP,ST,BLUE,DLX,8/PK,10PK/CS: Brand: MEDLINE

## (undated) DEVICE — SUTURE CHROMIC GUT SZ 5-0 L18IN ABSRB BRN P-3 L13MM 3/8 CIR 687G

## (undated) DEVICE — SUTURE PROL SZ 2-0 L30IN NONABSORBABLE BLU L26MM CT-2 1/2 8411H

## (undated) DEVICE — BLADE 1884080EM TRICUT 4MMX13CM M4 ROHS: Brand: FUSION®

## (undated) DEVICE — TUBING, SUCTION, 1/4" X 12', STRAIGHT: Brand: MEDLINE

## (undated) DEVICE — SOLUTION IV 250ML 0.9% SOD CHL PH 5 INJ USP VIAFLX PLAS